# Patient Record
Sex: MALE | Race: WHITE | NOT HISPANIC OR LATINO | Employment: OTHER | URBAN - METROPOLITAN AREA
[De-identification: names, ages, dates, MRNs, and addresses within clinical notes are randomized per-mention and may not be internally consistent; named-entity substitution may affect disease eponyms.]

---

## 2024-06-10 ENCOUNTER — TRANSFERRED RECORDS (OUTPATIENT)
Dept: HEALTH INFORMATION MANAGEMENT | Facility: CLINIC | Age: 74
End: 2024-06-10

## 2024-06-27 ENCOUNTER — TRANSCRIBE ORDERS (OUTPATIENT)
Dept: OTHER | Age: 74
End: 2024-06-27

## 2024-06-27 DIAGNOSIS — H04.209 UNSPECIFIED EPIPHORA, UNSPECIFIED SIDE: Primary | ICD-10-CM

## 2024-07-09 NOTE — TELEPHONE ENCOUNTER
FUTURE VISIT INFORMATION      FUTURE VISIT INFORMATION:  Date: 10/8/24  Time: 12:15pm  [place] [facility]FORMATION:  Referring provider:  CHUCK HOOKS  Referring providers clinic:  [facility]  Reason for visit/diagnosis  Unspecified epiphora, unspecified side, [facility]s [place] [facility]     RECORDS REQUESTED FROM:       Clinic name Comments Records Status Imaging Status   [facility] Request for recs sent 7/9- per Clinic no records of this patient     [facility] Request for [facility] sent 7/9- received and sent to scanning EPIC

## 2024-10-04 ENCOUNTER — TELEPHONE (OUTPATIENT)
Dept: OPHTHALMOLOGY | Facility: CLINIC | Age: 74
End: 2024-10-04
Payer: COMMERCIAL

## 2024-10-08 ENCOUNTER — PRE VISIT (OUTPATIENT)
Dept: OPHTHALMOLOGY | Facility: CLINIC | Age: 74
End: 2024-10-08

## 2024-10-08 ENCOUNTER — OFFICE VISIT (OUTPATIENT)
Dept: OPHTHALMOLOGY | Facility: CLINIC | Age: 74
End: 2024-10-08
Payer: COMMERCIAL

## 2024-10-08 DIAGNOSIS — H02.132 SENILE ECTROPION OF RIGHT LOWER EYELID: ICD-10-CM

## 2024-10-08 DIAGNOSIS — H04.563 ACQUIRED STENOSIS OF LACRIMAL PUNCTUM OF BOTH SIDES: ICD-10-CM

## 2024-10-08 DIAGNOSIS — H04.553 ACQUIRED OBSTRUCTION OF BOTH NASOLACRIMAL DUCTS: Primary | ICD-10-CM

## 2024-10-08 DIAGNOSIS — H02.135 SENILE ECTROPION OF LEFT LOWER EYELID: ICD-10-CM

## 2024-10-08 PROCEDURE — 31231 NASAL ENDOSCOPY DX: CPT | Performed by: OPHTHALMOLOGY

## 2024-10-08 PROCEDURE — 68810 PROBE NASOLACRIMAL DUCT: CPT | Mod: 50 | Performed by: OPHTHALMOLOGY

## 2024-10-08 PROCEDURE — 92285 EXTERNAL OCULAR PHOTOGRAPHY: CPT | Performed by: OPHTHALMOLOGY

## 2024-10-08 PROCEDURE — 99204 OFFICE O/P NEW MOD 45 MIN: CPT | Mod: 25 | Performed by: OPHTHALMOLOGY

## 2024-10-08 RX ORDER — PANTOPRAZOLE SODIUM 40 MG/1
40 TABLET, DELAYED RELEASE ORAL
COMMUNITY
Start: 2024-09-18 | End: 2025-09-18

## 2024-10-08 RX ORDER — ROSUVASTATIN CALCIUM 20 MG/1
1 TABLET, COATED ORAL AT BEDTIME
COMMUNITY
Start: 2024-03-21

## 2024-10-08 RX ORDER — INSULIN GLARGINE 100 [IU]/ML
16 INJECTION, SOLUTION SUBCUTANEOUS
COMMUNITY
Start: 2024-03-21 | End: 2025-03-21

## 2024-10-08 RX ORDER — ORAL SEMAGLUTIDE 14 MG/1
TABLET ORAL
COMMUNITY
Start: 2024-01-15

## 2024-10-08 RX ORDER — CARBOXYMETHYLCELLULOSE SODIUM 5 MG/ML
1 SOLUTION/ DROPS OPHTHALMIC
COMMUNITY
Start: 2024-01-03

## 2024-10-08 RX ORDER — VITAMIN B COMPLEX
1 CAPSULE ORAL DAILY
COMMUNITY

## 2024-10-08 RX ORDER — LISINOPRIL 10 MG/1
10 TABLET ORAL DAILY
COMMUNITY
Start: 2024-03-21 | End: 2025-03-21

## 2024-10-08 RX ORDER — METFORMIN HYDROCHLORIDE 500 MG/1
1000 TABLET, EXTENDED RELEASE ORAL
COMMUNITY
Start: 2024-09-18

## 2024-10-08 RX ORDER — ASPIRIN 81 MG/1
81 TABLET ORAL DAILY
COMMUNITY

## 2024-10-08 ASSESSMENT — TONOMETRY
IOP_METHOD: ICARE
OS_IOP_MMHG: 14
OD_IOP_MMHG: 13

## 2024-10-08 ASSESSMENT — CONF VISUAL FIELD
OD_INFERIOR_TEMPORAL_RESTRICTION: 0
OS_SUPERIOR_TEMPORAL_RESTRICTION: 0
COMMENTS: SOME PEAKING OU
OD_SUPERIOR_TEMPORAL_RESTRICTION: 0
OD_SUPERIOR_NASAL_RESTRICTION: 0
OS_INFERIOR_TEMPORAL_RESTRICTION: 0
METHOD: COUNTING FINGERS
OS_INFERIOR_NASAL_RESTRICTION: 0
OD_NORMAL: 1
OD_INFERIOR_NASAL_RESTRICTION: 0
OS_SUPERIOR_NASAL_RESTRICTION: 0
OS_NORMAL: 1

## 2024-10-08 ASSESSMENT — VISUAL ACUITY
OS_CC: 20/25
OD_CC+: -3
OD_CC: 20/20
METHOD: SNELLEN - LINEAR

## 2024-10-08 ASSESSMENT — PATIENT HEALTH QUESTIONNAIRE - PHQ9: SUM OF ALL RESPONSES TO PHQ QUESTIONS 1-9: 11

## 2024-10-08 NOTE — NURSING NOTE
Chief Complaints and History of Present Illnesses   Patient presents with    Consult For     Duane Melvin Anderson is being seen for a consult today by the request of Dr. Steiner for due to epiphora right eye worse than left eye.      Chief Complaint(s) and History of Present Illness(es)       Consult For              Laterality: both eyes    Treatments tried: artificial tears    Pain scale: 0/10    Comments: Duane Melvin Anderson is being seen for a consult today by the request of Dr. Steiner for due to epiphora right eye worse than left eye.               Comments    This has been an issues with each eye the past 10 years but worsening.   This interferes with vision and balance and difficult with already having neuropathy.     Has not had not had any intervention for this or tried anything other than lubricants in the past and not much help.    Mica Ly, COT COT 12:16 PM October 8, 2024

## 2024-10-08 NOTE — PROGRESS NOTES
Oculoplastic Clinic New Patient    Patient: Duane Melvin Anderson MRN# [id number]   YOB: 1950 Age: 74 year old   Date of Visit: Oct 8, 2024    CC: Tearing    Chief Complaint(s) and History of Present Illness(es)     Consult For            Laterality: both eyes    Treatments tried: artificial tears    Pain scale: 0/10    Comments: Duane Melvin Anderson is being seen for a consult today by the   request of Dr. Steiner for due to epiphora right eye worse than left eye.           Comments    This has been an issues with each eye the past 10 years but worsening.   This interferes with vision and balance and difficult with already having   neuropathy.     Has not had not had any intervention for this or tried anything other than   lubricants in the past and not much help.    Mica Ly, COT COT 12:16 PM October 8, 2024         Right is worse than left. There is no history of major trauma to the face, significant sinusitis, or sinus tumors. The eye does not feel dry but it does itch. The tears run down the cheeks, and obscure vision interfering with activities of daily living.     No history of chemo or radiation.  Non-smoker         Irrigation and Nasal Endoscopy:     PROCEDURE: Dilation and irrigation both eyes  SURGEON: Joi Arguelles MD  ANESTHESIA: Topical  COMPLICATIONS: None  EBL: Nil  FINDINGS: 50% block right eye,  50 block left eye punctal stenosis both eyes     The patient was placed supine in the examining chair. Proparacaine was placed in the eye.  The punctum was anesthesized with 2% lidocaine on a cotton tip applicator.  The punctum was dilated with punctal dilator. The 25 gauge lacrimal cannula was placed in the proximal canaliculus and the lacrimal system irrigated with water.  The patient tolerated the procedure well.   I was present for the entire procedure - Joi Arguelles MD  PROCEDURE: Nasal Endoscopy  SURGEON: Joi Arguelles MD  ANESTHESIA: Topical  COMPLICATIONS:  None  EBL: Nil  FINDINGS: Normal nasal exam for  lacrimal surgery      Indications: Examine the nasal cavity for lacrimal surgery    The zero degree nasal endoscope was passed under endonasally. The inferior turbinates appeared normal. There was  normal middle turbinate architecture. No polyps or purulence. No septal perforation. No masses or lesions were identified.     I was present for the entire procedure - Joi Arguelles MD           Assessment and Plan:     1. Acquired obstruction of both nasolacrimal ducts    2. 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 Senile ectropion of right lower eyelid    4. Senile ectropion of left lower eyelid      Consider both lower eyelid ectropion repair, punctoplasty and mini Monoka stent. Discussed he does have a partial nldo, but I suspect his symptoms would improve with the above. If not can consider other options.          Attending Physician Attestation: Complete documentation of historical and exam elements from today's encounter can be found in the full encounter summary report (not reduplicated in this progress note). I personally obtained the chief complaint(s) and history of present illness. I confirmed and edited as necessary the review of systems, past medical/surgical history, family history, social history, and examination findings as documented by others; and I examined the patient myself. I personally reviewed the relevant tests, images, and reports as documented above. I formulated and edited as necessary the assessment and plan and discussed the findings and management plan with the patient. Joi Arguelles MD      Today with Duane Melvin Anderson I reviewed the indications, risks, benefits, and alternatives of the proposed surgical procedure including, but not limited to, failure obtain the desired result  and need for additional surgery, bleeding, infection, loss of vision, loss of the eye, and the remote possibility of permanent damage to  any organ system or death. I provided multiple opportunities for the questions, answered all questions to the best of my ability, and confirmed that my answers and my discussion were understood. Joi Arguelles MD

## 2024-10-08 NOTE — LETTER
10/8/2024         RE:  :  MRN: Duane Melvin Anderson  1950  [id number]     Dear Dr. Steiner,    Thank you for asking me to see your patient, Duane Melvin Anderson, for an oculoplastic   consultation.  My assessment and plan are below.  For further details, please see my attached clinic note.      Oculoplastic Clinic New Patient    Patient: Duane Melvin Anderson MRN# [id number]   YOB: 1950 Age: 74 year old   Date of Visit: Oct 8, 2024    CC: Tearing    Chief Complaint(s) and History of Present Illness(es)     Consult For            Laterality: both eyes    Treatments tried: artificial tears    Pain scale: 0/10    Comments: Duane Melvin Anderson is being seen for a consult today by the   request of Dr. Steiner for due to epiphora right eye worse than left eye.           Comments    This has been an issues with each eye the past 10 years but worsening.   This interferes with vision and balance and difficult with already having   neuropathy.     Has not had not had any intervention for this or tried anything other than   lubricants in the past and not much help.    Mica Ly, COT COT 12:16 PM 2024         Right is worse than left. There is no history of major trauma to the face, significant sinusitis, or sinus tumors. The eye does not feel dry but it does itch. The tears run down the cheeks, and obscure vision interfering with activities of daily living.     No history of chemo or radiation.  Non-smoker         Irrigation and Nasal Endoscopy:     PROCEDURE: Dilation and irrigation both eyes  SURGEON: Joi Arguelles MD  ANESTHESIA: Topical  COMPLICATIONS: None  EBL: Nil  FINDINGS: 50% block right eye,  50 block left eye punctal stenosis both eyes     The patient was placed supine in the examining chair. Proparacaine was placed in the eye.  The punctum was anesthesized with 2% lidocaine on a cotton tip applicator.  The punctum was dilated with punctal dilator. The 25 gauge lacrimal  cannula was placed in the proximal canaliculus and the lacrimal system irrigated with water.  The patient tolerated the procedure well.   I was present for the entire procedure - Joi Arguelles MD  PROCEDURE: Nasal Endoscopy  SURGEON: Joi Arguelles MD  ANESTHESIA: Topical  COMPLICATIONS: None  EBL: Nil  FINDINGS: Normal nasal exam for  lacrimal surgery      Indications: Examine the nasal cavity for lacrimal surgery    The zero degree nasal endoscope was passed under endonasally. The inferior turbinates appeared normal. There was  normal middle turbinate architecture. No polyps or purulence. No septal perforation. No masses or lesions were identified.     I was present for the entire procedure - Joi Arguelles MD           Assessment and Plan:     1. Acquired obstruction of both nasolacrimal ducts    2. 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 Senile ectropion of right lower eyelid    4. Senile ectropion of left lower eyelid      Consider both lower eyelid ectropion repair, punctoplasty and mini Monoka stent. Discussed he does have a partial nldo, but I suspect his symptoms would improve with the above. If not can consider other options.         Again, thank you for allowing me to participate in the care of your patient.      Sincerely,    Joi Arguelles MD  Department of Ophthalmology and Visual Neurosciences  [facility]    CC: Javier Steiner MD  [address] [address]  Via Fax: [phone]

## 2024-10-10 ENCOUNTER — TELEPHONE (OUTPATIENT)
Dept: OPHTHALMOLOGY | Facility: CLINIC | Age: 74
End: 2024-10-10

## 2024-10-10 PROBLEM — H04.563 ACQUIRED STENOSIS OF LACRIMAL PUNCTUM OF BOTH SIDES: Status: ACTIVE | Noted: 2024-10-08

## 2024-10-10 PROBLEM — H02.132 SENILE ECTROPION OF RIGHT LOWER EYELID: Status: ACTIVE | Noted: 2024-10-08

## 2024-10-10 PROBLEM — H04.553 ACQUIRED OBSTRUCTION OF BOTH NASOLACRIMAL DUCTS: Status: ACTIVE | Noted: 2024-10-08

## 2024-10-10 PROBLEM — H02.135 SENILE ECTROPION OF LEFT LOWER EYELID: Status: ACTIVE | Noted: 2024-10-08

## 2024-10-10 NOTE — TELEPHONE ENCOUNTER
Called patient to schedule surgery with Dr. Tamez    Spoke with: Duane    Date(s) of Surgery: 12/12    Patient aware of approximate arrival time: Yes      Location of surgery: [facility]     Pre-Op H&P: [facility]     Informed patient that they need to call to schedule pre-op H&P within 30 days of surgery date: Yes      Post-Op Appt Dates: 1/7       Discussed with patient pre-op RN will call 2-3 days prior to surgery with arrival time and instructions:  Yes       Standard Surgery Packet Sent: Yes 10/10/24  via Mail - Standard      Additional Information Sent in Packet:  NA       Informed patient that they will need an adult  to bring patient home from surgery: Yes  : spouse         Additional Comments:  NA      All patients questions were answered and was instructed to review surgical packet and call back [phone] with any questions or concerns.       Alissa Olvera on 10/10/2024 at 6:27 PM

## 2024-11-18 ENCOUNTER — TRANSFERRED RECORDS (OUTPATIENT)
Dept: MULTI SPECIALTY CLINIC | Facility: CLINIC | Age: 74
End: 2024-11-18

## 2024-11-18 LAB
CREATININE (EXTERNAL): 0.97 MG/DL (ref 0.74–1.35)
GFR ESTIMATED (EXTERNAL): 81 ML/MIN/BSA
GLUCOSE (EXTERNAL): 137 MG/DL (ref 70–140)
POTASSIUM (EXTERNAL): 4.6 MMOL/L (ref 3.6–5.2)

## 2024-12-11 ENCOUNTER — ANESTHESIA EVENT (OUTPATIENT)
Dept: SURGERY | Facility: AMBULATORY SURGERY CENTER | Age: 74
End: 2024-12-11
Payer: COMMERCIAL

## 2024-12-12 ENCOUNTER — ANESTHESIA (OUTPATIENT)
Dept: SURGERY | Facility: AMBULATORY SURGERY CENTER | Age: 74
End: 2024-12-12
Payer: COMMERCIAL

## 2024-12-12 ENCOUNTER — HOSPITAL ENCOUNTER (OUTPATIENT)
Facility: AMBULATORY SURGERY CENTER | Age: 74
End: 2024-12-12
Attending: OPHTHALMOLOGY
Payer: COMMERCIAL

## 2024-12-12 VITALS
OXYGEN SATURATION: 98 % | WEIGHT: 200 LBS | SYSTOLIC BLOOD PRESSURE: 157 MMHG | RESPIRATION RATE: 18 BRPM | TEMPERATURE: 96.8 F | BODY MASS INDEX: 28 KG/M2 | HEART RATE: 68 BPM | HEIGHT: 71 IN | DIASTOLIC BLOOD PRESSURE: 87 MMHG

## 2024-12-12 DIAGNOSIS — Z98.890 POSTOPERATIVE EYE STATE: ICD-10-CM

## 2024-12-12 DIAGNOSIS — H02.135 SENILE ECTROPION OF LEFT LOWER EYELID: Primary | ICD-10-CM

## 2024-12-12 LAB — GLUCOSE BLDC GLUCOMTR-MCNC: 160 MG/DL (ref 70–99)

## 2024-12-12 RX ORDER — ONDANSETRON 4 MG/1
4 TABLET, ORALLY DISINTEGRATING ORAL EVERY 30 MIN PRN
Status: ACTIVE | OUTPATIENT
Start: 2024-12-12

## 2024-12-12 RX ORDER — TETRACAINE HYDROCHLORIDE 5 MG/ML
SOLUTION OPHTHALMIC PRN
Status: DISCONTINUED | OUTPATIENT
Start: 2024-12-12 | End: 2024-12-12 | Stop reason: HOSPADM

## 2024-12-12 RX ORDER — PROPOFOL 10 MG/ML
INJECTION, EMULSION INTRAVENOUS PRN
Status: DISCONTINUED | OUTPATIENT
Start: 2024-12-12 | End: 2024-12-12

## 2024-12-12 RX ORDER — ACETAMINOPHEN 325 MG/1
975 TABLET ORAL ONCE
Status: COMPLETED | OUTPATIENT
Start: 2024-12-12 | End: 2024-12-12

## 2024-12-12 RX ORDER — ERYTHROMYCIN 5 MG/G
OINTMENT OPHTHALMIC PRN
Status: DISCONTINUED | OUTPATIENT
Start: 2024-12-12 | End: 2024-12-12 | Stop reason: HOSPADM

## 2024-12-12 RX ORDER — SODIUM CHLORIDE, SODIUM LACTATE, POTASSIUM CHLORIDE, CALCIUM CHLORIDE 600; 310; 30; 20 MG/100ML; MG/100ML; MG/100ML; MG/100ML
INJECTION, SOLUTION INTRAVENOUS CONTINUOUS PRN
Status: DISCONTINUED | OUTPATIENT
Start: 2024-12-12 | End: 2024-12-12

## 2024-12-12 RX ORDER — LIDOCAINE HYDROCHLORIDE 20 MG/ML
INJECTION, SOLUTION INFILTRATION; PERINEURAL PRN
Status: DISCONTINUED | OUTPATIENT
Start: 2024-12-12 | End: 2024-12-12

## 2024-12-12 RX ORDER — OXYCODONE HYDROCHLORIDE 5 MG/1
5 TABLET ORAL
Status: ACTIVE | OUTPATIENT
Start: 2024-12-12

## 2024-12-12 RX ORDER — OXYCODONE HYDROCHLORIDE 5 MG/1
10 TABLET ORAL
Status: ACTIVE | OUTPATIENT
Start: 2024-12-12

## 2024-12-12 RX ORDER — ONDANSETRON 2 MG/ML
4 INJECTION INTRAMUSCULAR; INTRAVENOUS EVERY 30 MIN PRN
Status: ACTIVE | OUTPATIENT
Start: 2024-12-12

## 2024-12-12 RX ORDER — ERYTHROMYCIN 5 MG/G
OINTMENT OPHTHALMIC
Qty: 3.5 G | Refills: 1 | Status: SHIPPED | OUTPATIENT
Start: 2024-12-12

## 2024-12-12 RX ORDER — NALOXONE HYDROCHLORIDE 0.4 MG/ML
0.1 INJECTION, SOLUTION INTRAMUSCULAR; INTRAVENOUS; SUBCUTANEOUS
Status: ACTIVE | OUTPATIENT
Start: 2024-12-12

## 2024-12-12 RX ORDER — DEXAMETHASONE SODIUM PHOSPHATE 10 MG/ML
4 INJECTION, SOLUTION INTRAMUSCULAR; INTRAVENOUS
Status: ACTIVE | OUTPATIENT
Start: 2024-12-12

## 2024-12-12 RX ORDER — LIDOCAINE 40 MG/G
CREAM TOPICAL
Status: DISCONTINUED | OUTPATIENT
Start: 2024-12-12 | End: 2024-12-12 | Stop reason: HOSPADM

## 2024-12-12 RX ADMIN — ACETAMINOPHEN 975 MG: 325 TABLET ORAL at 10:30

## 2024-12-12 RX ADMIN — PROPOFOL 80 MG: 10 INJECTION, EMULSION INTRAVENOUS at 11:03

## 2024-12-12 RX ADMIN — LIDOCAINE HYDROCHLORIDE 100 MG: 20 INJECTION, SOLUTION INFILTRATION; PERINEURAL at 11:02

## 2024-12-12 RX ADMIN — SODIUM CHLORIDE, SODIUM LACTATE, POTASSIUM CHLORIDE, CALCIUM CHLORIDE: 600; 310; 30; 20 INJECTION, SOLUTION INTRAVENOUS at 10:49

## 2024-12-12 NOTE — OP NOTE
PREOPERATIVE DIAGNOSIS:  1.  Bilateral lower eyelid ectropion  2.  Bilateral nasolacrimal duct obstruction and punctal stenosis    POSTOPERATIVE DIAGNOSIS:   1.  Bilateral lower eyelid ectropion  2.  Bilateral nasolacrimal duct obstruction and punctal stenosis    PROCEDURE:    1. Bilateral lower eyelid ectropion repair by tarsal strip procedure   2.  Bilateral punctoplasty and and silicone intubation of lacrimal system    ANESTHESIA: Monitored with local infiltration of a 50/50 mixture of 2% lidocaine with epinephrine and 0.5% Marcaine.     SURGEON: Joi Arguelles MD    ASSISTANT: Maxime Koch MD    COMPLICATIONS: None.     ESTIMATED BLOOD LOSS: Less than 5 mL.     HISTORY: Duane Melvin Anderson  presented with tearing  due to lower lid ectropion and lacrimal obstruction. After the risks, benefits and alternatives to the proposed procedure were explained, informed consent was obtained.     DESCRIPTION OF PROCEDURE: Duane Melvin Anderson was brought to the operating room and placed supine on the operating table. IV sedation was given. The lower lids and lateral canthal areas were infiltrated with local anesthetic.  The medial canthal area and lacrimal sac were also infiltrated with local anesthetic. The area was prepped and draped in the typical fashion.     Attention was directed to the right side. The lower canalicular systems were dilated with the punctal dilator and a punctoplasty was performed in a stretch punctoplasty fashion. The mini-Monoka silicone intubation set was used to intubate thelower canalicular system and threaded into the lacrimal sac. The footplate was set into the enlarged punctum. Attention directed to the left side where the same procedure was performed.    Attention was directed to the right side. A canthotomy and inferior cantholysis was performed with a Cynthia scissor. A lateral tarsal strip was fashioned with the high temperature cautery and the Cynthia scissors. The tarsal strip  was secured to the lateral orbital rim periosteum with a double-armed 5-0 Vicryl suture in a horizontal mattress fashion. Lateral canthal angle was closed with a gray line to gray line suture of 5-0 Vicryl tied internally. The patient tolerated the procedure well. Attention was directed to the left side where the same procedure was performed. Antibiotic ointment was applied to the incisions. Duane Melvin Anderson left the operating room in stable condition.     Joi Arguelles MD

## 2024-12-12 NOTE — DISCHARGE INSTRUCTIONS
Post-operative Instructions  Ophthalmic Plastic and Reconstructive Surgery    Joi Arguelles M.D.     All instructions apply to the operated eye(s) or eyelid(s).    Wound care and personal care  ? Apply ice compresses and gentle pressure 15 minutes on, 15 minutes off, for 2 days. If you are sleeping, you don't need to wake up to ice. As long as there is no further bleeding, after two days, switch to warm water compresses for five minutes, four times a day until seen by your physician.   ? You may shower or wash your hair the day after surgery. Do not go swimming for at least 2 weeks to prevent contamination of your wounds.  ? You may go for walks and light activity is ok, but no heavy (over 15 pounds) lifting, bending or excessive straining for one week.   ? Do not apply make-up to the eyes or eyelids for 2 weeks after surgery.  ? Expect some swelling, bruising, black eye (even into the lower eyelids and cheeks). Also expect serum caking, crusting and discharge from the eye and/or incisions. A small amount of surface bleeding, and depending on the type of surgery, bleeding from the inside of the eyelid, is normal for the first 48 hours.  ? Avoid straining, bending at the waist, or lifting more than 15 pounds for 1 week. Sleeping with your head elevated, such as in a recliner, for the first several days can help swelling resolve more quickly.   ? Do continue to ambulate (walk) as you normally would - being sedentary after surgery can cause blood clots.   ? Your eye(s) and eyelid(s) may be painful and tender. This is normal after surgery.      Contact information and follow-up  ? Return to the Eye Clinic for a follow-up appointment with your physician as scheduled. If no appointment has been scheduled:   - [facility]: [phone] for an appointment with Dr. Arguelles within 2 to 3 weeks from your date of surgery.    After hours or on weekends and holidays, call [phone] and ask to  speak with the ophthalmologist on call.    An on call person can be reached after hours for concerns. The on call doctor should not call in medication refill requests after hours or on weekends, so please plan accordingly. An effort has been made to provide adequate pain medications following every surgery, and refills will not be provided in most instances.     Medications  ? Restart all regular home medications and eye drops. If you take Plavix or Aspirin on a regular basis, wait for 72 hours after your surgery before restarting these in order to decrease the risk of bleeding complications.  ? Avoid aspirin and aspirin-like medications (Motrin, Aleve, Ibuprofen, Julia-Vail etc) for 72 hours to reduce the risk of bleeding. You may take Tylenol (acetaminophen) for pain.  ? In addition to your home medications, take the following post-operative medications as prescribed by your physician.    ? Apply antibiotic ointment to all sutures three times a day, and into the operated eye(s) at night.   Once you run out, you can apply Vaseline or Aquaphor (over the counter) to the incisions. Don't put the Vaseline or Aquaphor into your eyes.   ? If you have ocular irritation, you can use over the counter artificial tears such as Refresh, Systane, or Blink. Do not use Visine, Clear Eyes, or any other drop that gets the red out.         [facility]  Home Care Following Anesthesia  For 24 hours after surgery:  Get plenty of rest.  A responsible adult must stay with you for at least 24 hours after you leave the surgery center.  Do not drive or use heavy equipment.  If you have weakness or tingling, don't drive or use heavy equipment until this feeling goes away.   Do not drink alcohol.   Avoid strenuous or risky activities.  Ask for help when climbing stairs.  You may feel lightheaded.  IF so, sit for a few minutes before standing.  Have someone help you get up.   If you have nausea (feel sick  to your stomach): Drink only clear liquids such as apple juice, ginger ale, broth or 7-Up.  Rest may also help.  Be sure to drink enough fluids.  Move to a regular diet as you feel able.   You may have a slight fever.  Call the doctor if your fever is over 100 F (37.7 C) (taken under the tongue) or lasts longer than 24 hours.  You may have a dry mouth, a sore throat, muscle aches or trouble sleeping. These should go away after 24 hours.  Do not make important or legal decisions.   It is recommended to avoid smoking.               Tips for taking pain medications  To get the best pain relief possible, remember these points:  Take pain medications as directed, before pain becomes severe.  Pain medication can upset your stomach: taking it with food may help.  Constipation is a common side effect of pain medication. Drink plenty of  fluids.  Eat foods high in fiber. Take a stool softener if recommended by your doctor or pharmacist.  Do not drink alcohol, drive or operate machinery while taking pain medications.  Ask about other ways to control pain, such as with heat, ice or relaxation.    Tylenol/Acetaminophen Consumption    If you feel your pain relief is insufficient, you may take Tylenol/Acetaminophen in addition to your narcotic pain medication.   Be careful not to exceed 4,000 mg of Tylenol/Acetaminophen in a 24 hour period from all sources.  If you are taking extra strength Tylenol/acetaminophen (500 mg), the maximum dose is 8 tablets in 24 hours.  If you are taking regular strength acetaminophen (325 mg), the maximum dose is 12 tablets in 24 hours.    Call a doctor for any of the following:  Signs of infection (fever, growing tenderness at the surgery site, a large amount of drainage or bleeding, severe pain, foul-smelling drainage, redness, swelling).  It has been over 8 to 10 hours since surgery and you are still not able to urinate (pass water).  Headache for over 24 hours.  Numbness, tingling or weakness the  day after surgery (if you had spinal anesthesia).  Signs of Covid-19 infection (temperature over 100 degrees, shortness of breath, cough, loss of taste/smell, generalized body aches, persistent headache, chills, sore throat, nausea/vomiting/diarrhea)  Your doctor is:       Dr. Joi Arguelles, Ophthalmology: [phone]               Or dial [phone] and ask for the resident on call for:  Ophthalmology  For emergency care, call the:  [facility] [facility]:  [phone] (TTY for hearing impaired: [phone])

## 2024-12-12 NOTE — ANESTHESIA PREPROCEDURE EVALUATION
"Anesthesia Pre-Procedure Evaluation    Patient: Duane Melvin Anderson   MRN: [id number] : 1950        Procedure : Procedure(s):  Bilateral lower eyelid ectropion repair  punctoplasty, and silicone stent of lacrimal system          No past medical history on file.   No past surgical history on file.   No Known Allergies   Social History     Tobacco Use    Smoking status: Never    Smokeless tobacco: Never   Substance Use Topics    Alcohol use: Not on file      Wt Readings from Last 1 Encounters:   No data found for Wt        Anesthesia Evaluation            ROS/MED HX  ENT/Pulmonary:  - neg pulmonary ROS     Neurologic:     (+)    peripheral neuropathy,                            Cardiovascular:     (+) Dyslipidemia hypertension- -   -  - -                                      METS/Exercise Tolerance:     Hematologic:  - neg hematologic  ROS     Musculoskeletal:  - neg musculoskeletal ROS     GI/Hepatic:     (+) GERD,                   Renal/Genitourinary:  - neg Renal ROS     Endo:     (+)  type II DM,   Using insulin,                 Psychiatric/Substance Use:  - neg psychiatric ROS     Infectious Disease:  - neg infectious disease ROS     Malignancy:       Other:            Physical Exam    Airway  airway exam normal      Mallampati: II       Respiratory Devices and Support         Dental       (+) Minor Abnormalities - some fillings, tiny chips      Cardiovascular   cardiovascular exam normal          Pulmonary   pulmonary exam normal                OUTSIDE LABS:  CBC: No results found for: \"WBC\", \"HGB\", \"HCT\", \"PLT\"  BMP: No results found for: \"NA\", \"POTASSIUM\", \"CHLORIDE\", \"CO2\", \"BUN\", \"CR\", \"GLC\"  COAGS: No results found for: \"PTT\", \"INR\", \"FIBR\"  POC: No results found for: \"BGM\", \"HCG\", \"HCGS\"  HEPATIC: No results found for: \"ALBUMIN\", \"PROTTOTAL\", \"ALT\", \"AST\", \"GGT\", \"ALKPHOS\", \"BILITOTAL\", \"BILIDIRECT\", \"ESTEFANIA\"  OTHER: No results found for: \"PH\", \"LACT\", \"A1C\", \"MIKO\", \"PHOS\", \"MAG\", \"LIPASE\", " "\"AMYLASE\", \"TSH\", \"T4\", \"T3\", \"CRP\", \"SED\"    Anesthesia Plan    ASA Status:  2    NPO Status:  NPO Appropriate    Anesthesia Type: MAC.     - Reason for MAC: straight local not clinically adequate   Induction: Intravenous.   Maintenance: TIVA.        Consents    Anesthesia Plan(s) and associated risks, benefits, and realistic alternatives discussed. Questions answered and patient/representative(s) expressed understanding.     - Discussed: Risks, Benefits and Alternatives for BOTH SEDATION and the PROCEDURE were discussed     - Discussed with:  Patient            Postoperative Care    Pain management: IV analgesics, Oral pain medications, Multi-modal analgesia.   PONV prophylaxis: Ondansetron (or other 5HT-3), Dexamethasone or Solumedrol, Background Propofol Infusion     Comments:               Jamaal Mora MD    I have reviewed the pertinent notes and labs in the chart from the past 30 days and (re)examined the patient.  Any updates or changes from those notes are reflected in this note.                                   "

## 2024-12-12 NOTE — BRIEF OP NOTE
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

## 2024-12-12 NOTE — ANESTHESIA POSTPROCEDURE EVALUATION
Patient: Duane Melvin Anderson    Procedure: Procedure(s):  Bilateral lower eyelid ectropion repair  punctoplasty, and silicone stent of lacrimal system       Anesthesia Type:  MAC    Note:  Disposition: Outpatient   Postop Pain Control: Uneventful            Sign Out: Well controlled pain   PONV: No   Neuro/Psych: Uneventful            Sign Out: Acceptable/Baseline neuro status   Airway/Respiratory: Uneventful            Sign Out: Acceptable/Baseline resp. status   CV/Hemodynamics: Uneventful            Sign Out: Acceptable CV status; No obvious hypovolemia; No obvious fluid overload   Other NRE: NONE   DID A NON-ROUTINE EVENT OCCUR?            Last vitals:  Vitals Value Taken Time   /87 12/12/24 1200   Temp 36  C (96.8  F) 12/12/24 1200   Pulse 69 12/12/24 1200   Resp 18 12/12/24 1200   SpO2 97 % 12/12/24 1203   Vitals shown include unfiled device data.    Electronically Signed By: Jamaal Mora MD  December 12, 2024  1:35 PM

## 2024-12-12 NOTE — ANESTHESIA CARE TRANSFER NOTE
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    Anesthesia Type:   MAC     Note:    Oropharynx: oropharynx clear of all foreign objects and spontaneously breathing  Level of Consciousness: awake  Oxygen Supplementation: room air    Independent Airway: airway patency satisfactory and stable  Dentition: dentition unchanged  Vital Signs Stable: post-procedure vital signs reviewed and stable  Report to RN Given: handoff report given  Patient transferred to: Phase II    Handoff Report: Identifed the Patient, Identified the Reponsible Provider, Reviewed the pertinent medical history, Discussed the surgical course, Reviewed Intra-OP anesthesia mangement and issues during anesthesia, Set expectations for post-procedure period and Allowed opportunity for questions and acknowledgement of understanding    Vitals:  Vitals Value Taken Time   /86 12/12/24 1135   Temp 36  C (96.8  F) 12/12/24 1135   Pulse 72 12/12/24 1135   Resp 18 12/12/24 1135   SpO2 96 % 12/12/24 1137   Vitals shown include unfiled device data.    Electronically Signed By: MILAGRO Cook CRNA  December 12, 2024  11:38 AM

## 2025-01-07 ENCOUNTER — OFFICE VISIT (OUTPATIENT)
Dept: OPHTHALMOLOGY | Facility: CLINIC | Age: 75
End: 2025-01-07
Payer: COMMERCIAL

## 2025-01-07 DIAGNOSIS — H02.135 SENILE ECTROPION OF LEFT LOWER EYELID: ICD-10-CM

## 2025-01-07 DIAGNOSIS — Z98.890 POSTOPERATIVE EYE STATE: Primary | ICD-10-CM

## 2025-01-07 DIAGNOSIS — H02.132 SENILE ECTROPION OF RIGHT LOWER EYELID: ICD-10-CM

## 2025-01-07 PROCEDURE — 99024 POSTOP FOLLOW-UP VISIT: CPT | Mod: GC | Performed by: OPHTHALMOLOGY

## 2025-01-07 ASSESSMENT — VISUAL ACUITY
CORRECTION_TYPE: GLASSES
OS_CC: 20/40
METHOD: SNELLEN - LINEAR
OD_CC: 20/50

## 2025-01-07 ASSESSMENT — TONOMETRY
OD_IOP_MMHG: 11
IOP_METHOD: ICARE
OS_IOP_MMHG: 12

## 2025-01-07 ASSESSMENT — EXTERNAL EXAM - LEFT EYE: OS_EXAM: NORMAL

## 2025-01-07 ASSESSMENT — SLIT LAMP EXAM - LIDS: COMMENTS: NORMAL

## 2025-01-07 ASSESSMENT — EXTERNAL EXAM - RIGHT EYE: OD_EXAM: NORMAL

## 2025-01-07 NOTE — PROGRESS NOTES
"Chief Complaint(s) and History of Present Illness(es)     Post Op (Ophthalmology) Both Eyes            Laterality: both eyes    Quality: blurred vision    Associated symptoms: tearing.  Negative for eye pain    Treatments tried: ointment    Pain scale: 0/10    Comments: POW#3 s/p BLL ectropion repair by tarsal strip procedure,   Bilateral punctoplasty and silicone intubation of lacrimal system          Comments    Pt notes he has some scratching feeling in the outer corner of the OD,   still having tearing, symptoms feel the same as before surgery, denies   pain or irritation.     Pt is using EES oksana at bedtime OU, almost out, last used last night.     Chely Christina COT January 7, 2025 11:08 AM      Doing well. Happy with outcome. Some irritation right eye, there was a lateral canthal reforming suture which was removed. Stent on the right side is no longer present, but punctum is patent. Left side stent removed.     Patient Instructions   - Apply warm compresses for 1 minute two to three times a day until your bruising has resolved. You can continue this for one more month.   - Apply Aquaphor or Vaseline to the incision at bedtime until it is smooth.  That can take another month.   - If you have symptoms of eye irritation, it is good to use over the counter artificial tears. Good brands include Refresh, Blink, and Systane. Do NOT get drops that are for \"red eyes.\"   - It is normal for the incision to appear raised, red, itch and have small lumps. You can gently massage any small bumps along the incision line. These can take up to six months to resolve.    Return if symptoms worsen or fail to improve. (They live 1.5 hours away). See me if residual symptoms of epiphora/irritation.       Attending Physician Attestation: Complete documentation of historical and exam elements from today's encounter can be found in the full encounter summary report (not reduplicated in this progress note). I personally obtained the chief " complaint(s) and history of present illness. I confirmed and edited as necessary the review of systems, past medical/surgical history, family history, social history, and examination findings as documented by others; and I examined the patient myself. I personally reviewed the relevant tests, images, and reports as documented above. I formulated and edited as necessary the assessment and plan and discussed the findings and management plan with the patient and family. I personally reviewed the ophthalmic test(s) associated with this encounter, agree with the interpretation(s) as documented by the resident/fellow, and have edited the corresponding report(s) as necessary. Joi Arguelles MD

## 2025-01-07 NOTE — NURSING NOTE
Chief Complaints and History of Present Illnesses   Patient presents with    Post Op (Ophthalmology) Both Eyes     POW#3 s/p BLL ectropion repair by tarsal strip procedure, Bilateral punctoplasty and silicone intubation of lacrimal system     Chief Complaint(s) and History of Present Illness(es)       Post Op (Ophthalmology) Both Eyes              Laterality: both eyes    Quality: blurred vision    Associated symptoms: tearing.  Negative for eye pain    Treatments tried: ointment    Pain scale: 0/10    Comments: POW#3 s/p BLL ectropion repair by tarsal strip procedure, Bilateral punctoplasty and silicone intubation of lacrimal system              Comments    Pt notes he has some scratching feeling in the outer corner of the OD, still having tearing, symptoms feel the same as before surgery, denies pain or irritation.     Pt is using EES oksana at bedtime OU, almost out, last used last night.     Chely KWON January 7, 2025 11:08 AM

## 2025-01-07 NOTE — PATIENT INSTRUCTIONS
"- Apply warm compresses for 1 minute two to three times a day until your bruising has resolved. You can continue this for one more month.   - Apply Aquaphor or Vaseline to the incision at bedtime until it is smooth.  That can take another month.   - If you have symptoms of eye irritation, it is good to use over the counter artificial tears. Good brands include Refresh, Blink, and Systane. Do NOT get drops that are for \"red eyes.\"   - It is normal for the incision to appear raised, red, itch and have small lumps. You can gently massage any small bumps along the incision line. These can take up to six months to resolve.    "

## 2025-08-18 ENCOUNTER — TRANSFERRED RECORDS (OUTPATIENT)
Dept: HEALTH INFORMATION MANAGEMENT | Facility: CLINIC | Age: 75
End: 2025-08-18

## 2025-08-21 ENCOUNTER — OFFICE VISIT (OUTPATIENT)
Dept: OPHTHALMOLOGY | Facility: CLINIC | Age: 75
End: 2025-08-21
Payer: COMMERCIAL

## 2025-08-21 DIAGNOSIS — H02.132 SENILE ECTROPION OF RIGHT LOWER EYELID: Primary | ICD-10-CM

## 2025-08-21 DIAGNOSIS — H04.553 ACQUIRED OBSTRUCTION OF BOTH NASOLACRIMAL DUCTS: ICD-10-CM

## 2025-08-21 ASSESSMENT — TONOMETRY
OS_IOP_MMHG: 09
OD_IOP_MMHG: 15
IOP_METHOD: ICARE

## 2025-08-21 ASSESSMENT — VISUAL ACUITY
OS_SC: 20/25
OS_SC+: -2
METHOD: SNELLEN - LINEAR
OD_SC: 20/40

## (undated) RX ORDER — ACETAMINOPHEN 325 MG/1
TABLET ORAL
Status: DISPENSED
Start: 2024-12-12

## (undated) RX ORDER — TRANEXAMIC ACID 100 MG/ML
INJECTION, SOLUTION INTRAVENOUS
Status: DISPENSED
Start: 2024-12-12